# Patient Record
Sex: MALE | URBAN - NONMETROPOLITAN AREA
[De-identification: names, ages, dates, MRNs, and addresses within clinical notes are randomized per-mention and may not be internally consistent; named-entity substitution may affect disease eponyms.]

---

## 2020-04-13 ENCOUNTER — APPOINTMENT (RX ONLY)
Dept: URBAN - NONMETROPOLITAN AREA CLINIC 6 | Facility: CLINIC | Age: 40
Setting detail: DERMATOLOGY
End: 2020-04-13

## 2020-04-13 DIAGNOSIS — L30.8 OTHER SPECIFIED DERMATITIS: ICD-10-CM

## 2020-04-13 PROBLEM — L30.9 DERMATITIS, UNSPECIFIED: Status: ACTIVE | Noted: 2020-04-13

## 2020-04-13 PROCEDURE — ? PRESCRIPTION

## 2020-04-13 PROCEDURE — ? COUNSELING

## 2020-04-13 PROCEDURE — ? TREATMENT REGIMEN

## 2020-04-13 PROCEDURE — 99201: CPT

## 2020-04-13 RX ORDER — TACROLIMUS 1 MG/G
OINTMENT TOPICAL BID
Qty: 1 | Refills: 3 | Status: ERX | COMMUNITY
Start: 2020-04-13

## 2020-04-13 RX ADMIN — TACROLIMUS: 1 OINTMENT TOPICAL at 00:00

## 2020-04-13 ASSESSMENT — LOCATION DETAILED DESCRIPTION DERM
LOCATION DETAILED: RIGHT RADIAL DORSAL HAND
LOCATION DETAILED: RIGHT THENAR EMINENCE
LOCATION DETAILED: LEFT RADIAL PALM
LOCATION DETAILED: LEFT RADIAL DORSAL HAND

## 2020-04-13 ASSESSMENT — LOCATION SIMPLE DESCRIPTION DERM
LOCATION SIMPLE: LEFT HAND
LOCATION SIMPLE: RIGHT HAND

## 2020-04-13 ASSESSMENT — LOCATION ZONE DERM: LOCATION ZONE: HAND

## 2020-04-13 NOTE — PROCEDURE: TREATMENT REGIMEN
Samples Given: Cetaphil cleanser, Cetaphil moisturizer, All Free and Clear laundry detergent.
Plan: Wear white cotton glove liners under protective gloves when cooking, woodworking, and at bedtime. May wear white cotton glove liners while working on the computer also. Encouraged to switch to all fragrance free products. Provided him with our hand protection sheet and sensitive skin care sheet.
Detail Level: Zone
Initiate Treatment: Protopic ointment BID during the off week of the Betamethasone.
Continue Regimen: Betamethasone diproprionate cream QAM, and ointment QHS for 1 week on, then 1 week off. Repeat PRN.
Otc Regimen: Vaseline at bedtime over the steroid cream, under white cotton glove liners. Apply good moisturizer throughout the day.

## 2020-12-10 ENCOUNTER — APPOINTMENT (RX ONLY)
Dept: URBAN - NONMETROPOLITAN AREA CLINIC 6 | Facility: CLINIC | Age: 40
Setting detail: DERMATOLOGY
End: 2020-12-10

## 2020-12-10 DIAGNOSIS — L20.89 OTHER ATOPIC DERMATITIS: ICD-10-CM

## 2020-12-10 PROCEDURE — ? COUNSELING

## 2020-12-10 PROCEDURE — ? PRESCRIPTION

## 2020-12-10 PROCEDURE — ? TREATMENT REGIMEN

## 2020-12-10 PROCEDURE — 99212 OFFICE O/P EST SF 10 MIN: CPT

## 2020-12-10 RX ORDER — TACROLIMUS 1 MG/G
OINTMENT TOPICAL BID
Qty: 1 | Refills: 3 | Status: ERX

## 2020-12-10 RX ORDER — TRIAMCINOLONE ACETONIDE 1 MG/G
OINTMENT TOPICAL BID
Qty: 1 | Refills: 1 | Status: ERX | COMMUNITY
Start: 2020-12-10

## 2020-12-10 RX ADMIN — TRIAMCINOLONE ACETONIDE: 1 OINTMENT TOPICAL at 00:00

## 2020-12-10 NOTE — PROCEDURE: TREATMENT REGIMEN
Plan: Wear white cotton glove liners under protective gloves when cooking, woodworking, and at bedtime. May wear white cotton glove liners while working on the computer also. Encouraged to switch to all fragrance free products. Provided him with recommended OTC fragrance and dye free products.\\nDiscussed Dupixent risks and benefits at length, pt is interested in pursuing this as a treatment option.  Also discussed patch testing and UV light therapy.  Pt denies any history of cold sores or contraindications to Dupixent.\\nStarting paperwork for Dupixent and will be in contact with him to see if he does want to proceed with injections or if topical regimen is working well.
Initiate Treatment: Triamcinolone 0.1 % ointment BID followed by thick moisturizer for 2 weeks on and one week off
Otc Regimen: Vaseline at bedtime over his Rx ointments, under white cotton glove liners. Apply good moisturizer throughout the day.
Detail Level: Zone
Continue Regimen: Protopic 0.1% ointment BID on hands during week off from steroid ointment
Samples Given: CeraVe moisturizer ointment
Discontinue Regimen: Betamethasone cream

## 2021-01-07 ENCOUNTER — RX ONLY (OUTPATIENT)
Age: 41
Setting detail: RX ONLY
End: 2021-01-07

## 2021-01-07 RX ORDER — DUPILUMAB 300 MG/2ML
INJECTION, SOLUTION SUBCUTANEOUS
Qty: 1 | Refills: 5 | Status: ERX | COMMUNITY
Start: 2021-01-07

## 2021-03-04 ENCOUNTER — APPOINTMENT (RX ONLY)
Dept: URBAN - NONMETROPOLITAN AREA CLINIC 6 | Facility: CLINIC | Age: 41
Setting detail: DERMATOLOGY
End: 2021-03-04

## 2021-03-04 DIAGNOSIS — L20.89 OTHER ATOPIC DERMATITIS: ICD-10-CM | Status: IMPROVED

## 2021-03-04 PROCEDURE — 99213 OFFICE O/P EST LOW 20 MIN: CPT

## 2021-03-04 PROCEDURE — ? COUNSELING

## 2021-03-04 PROCEDURE — ? TREATMENT REGIMEN

## 2021-03-04 NOTE — HPI: MEDICATION (DUPIXENT)
Is This A New Presentation, Or A Follow-Up?: Follow Up Kristy
What Type Of Rash Do You Have?: atopic dermatitis
How Severe Is It?: moderate
Additional History: Pt has had his loading dose and another injection last week.  His itching has improved dramatically and his hands are much better.  He had a little eye irritation, but nothing that he felt he needed treatment for.  He has been injecting into his upper thighs and denies any injection site reaction.  He feels that his asthma has improved as well.

## 2021-03-04 NOTE — PROCEDURE: TREATMENT REGIMEN
Continue Regimen: Dupixent injection 300mg Q 2 weeks, Protopic 0.1% ointment BID on hands during week off from steroid ointment, Triamcinolone 0.1 % ointment BID followed by thick moisturizer for 2 weeks on and one week off
Otc Regimen: Vaseline at bedtime over his Rx ointments, under white cotton glove liners. Apply good moisturizer throughout the day.
Plan: Continue all fragrance free products.  Pt may add in lubricating eye drops daily or as needed to help with conjunctivitis.
Detail Level: Zone
Samples Given: CeraVe moisturizer ointment

## 2021-06-01 ENCOUNTER — APPOINTMENT (RX ONLY)
Dept: URBAN - NONMETROPOLITAN AREA CLINIC 6 | Facility: CLINIC | Age: 41
Setting detail: DERMATOLOGY
End: 2021-06-01

## 2021-06-01 DIAGNOSIS — L20.89 OTHER ATOPIC DERMATITIS: ICD-10-CM

## 2021-06-01 PROCEDURE — ? COUNSELING

## 2021-06-01 PROCEDURE — ? DUPIXENT MONITORING

## 2021-06-01 PROCEDURE — 99214 OFFICE O/P EST MOD 30 MIN: CPT

## 2021-06-01 PROCEDURE — ? TREATMENT REGIMEN

## 2021-06-01 NOTE — HPI: MEDICATION (DUPIXENT)
Is This A New Presentation, Or A Follow-Up?: Follow Up Kristy
What Type Of Rash Do You Have?: atopic dermatitis
How Severe Is It?: moderate
How Long Have You Been Taking Dupixent?: 3 months
Additional History: Pt’s left hand has cleared, but he still has some eczematous patches on his right hand.  He states the injection is very uncomfortable for him. He has the autoinjector and has tried different areas on his left, but it always really burns when the medication is going in.  He does not have any trouble with redness or swelling of the injection site.  He has not tried injecting his abdomen.  He went through the videos and injection training at our office.

## 2021-06-01 NOTE — PROCEDURE: TREATMENT REGIMEN
Otc Regimen: Vaseline at bedtime over his Rx ointments, under white cotton glove liners. Apply good moisturizer throughout the day.
Plan: Continue all fragrance free products.  Pt may add in lubricating eye drops daily or as needed if conjunctivitis occurs.  He is experiencing discomfort with the injections.  Discussed options of injecting into his abdomen, coming to our office for injections, or switching to a syringe vs the autoinjector.  Pt had called Dupixent for recommendations and they didn’t get back to him.  Placed a call to our Dupixent rep who will have a nurse from [facility] call him with recommendations.
Continue Regimen: Dupixent injection 300mg Q 2 weeks, Protopic 0.1% ointment BID on hands during week off from steroid ointment, Triamcinolone 0.1 % ointment BID followed by thick moisturizer for 2 weeks on and one week off
Detail Level: Zone

## 2021-12-07 ENCOUNTER — APPOINTMENT (RX ONLY)
Dept: URBAN - NONMETROPOLITAN AREA CLINIC 6 | Facility: CLINIC | Age: 41
Setting detail: DERMATOLOGY
End: 2021-12-07

## 2021-12-07 DIAGNOSIS — L20.89 OTHER ATOPIC DERMATITIS: ICD-10-CM | Status: IMPROVED

## 2021-12-07 PROCEDURE — ? COUNSELING

## 2021-12-07 PROCEDURE — ? DUPIXENT MONITORING

## 2021-12-07 PROCEDURE — ? TREATMENT REGIMEN

## 2021-12-07 PROCEDURE — 99214 OFFICE O/P EST MOD 30 MIN: CPT

## 2021-12-07 ASSESSMENT — BSA ECZEMA: % BODY COVERED IN ECZEMA: 1

## 2021-12-07 NOTE — HPI: MEDICATION (DUPIXENT)
Is This A New Presentation, Or A Follow-Up?: Follow Up Kristy
What Type Of Rash Do You Have?: atopic dermatitis
How Severe Is It?: moderate
How Long Have You Been Taking Dupixent?: 9 months
Additional History: Pt has been doing very well.  His left hand is clear, he just has a small thick scaly patch on his right hand.  He states he is doing better with the injections, some are just a little more uncomfortable than others.  Overall, he is very pleased with his progress.

## 2021-12-07 NOTE — PROCEDURE: TREATMENT REGIMEN
Otc Regimen: Vaseline at bedtime over his Rx ointments, under white cotton glove liners. Apply good moisturizer throughout the day.
Detail Level: Zone
Continue Regimen: Dupixent injection 300mg Q 2 weeks, Protopic 0.1% ointment BID on hands during week off from steroid ointment, Triamcinolone 0.1 % ointment BID followed by thick moisturizer for 2 weeks on and one week off

## 2022-06-14 ENCOUNTER — APPOINTMENT (RX ONLY)
Dept: URBAN - NONMETROPOLITAN AREA CLINIC 6 | Facility: CLINIC | Age: 42
Setting detail: DERMATOLOGY
End: 2022-06-14

## 2022-06-14 DIAGNOSIS — L82.1 OTHER SEBORRHEIC KERATOSIS: ICD-10-CM

## 2022-06-14 DIAGNOSIS — L20.89 OTHER ATOPIC DERMATITIS: ICD-10-CM

## 2022-06-14 PROCEDURE — ? DUPIXENT MONITORING

## 2022-06-14 PROCEDURE — 99214 OFFICE O/P EST MOD 30 MIN: CPT

## 2022-06-14 PROCEDURE — ? TREATMENT REGIMEN

## 2022-06-14 PROCEDURE — ? COUNSELING

## 2022-06-14 ASSESSMENT — LOCATION SIMPLE DESCRIPTION DERM: LOCATION SIMPLE: LEFT CHEEK

## 2022-06-14 ASSESSMENT — BSA ECZEMA: % BODY COVERED IN ECZEMA: 1

## 2022-06-14 ASSESSMENT — LOCATION DETAILED DESCRIPTION DERM: LOCATION DETAILED: LEFT INFERIOR CENTRAL MALAR CHEEK

## 2022-06-14 ASSESSMENT — ITCH NUMERIC RATING SCALE: HOW SEVERE IS YOUR ITCHING?: 1

## 2022-06-14 ASSESSMENT — LOCATION ZONE DERM: LOCATION ZONE: FACE

## 2022-06-14 NOTE — HPI: MEDICATION (DUPIXENT)
Is This A New Presentation, Or A Follow-Up?: Follow Up Kristy
What Type Of Rash Do You Have?: atopic dermatitis
How Severe Is It?: severe
How Long Have You Been Taking Dupixent?: 15 months
Additional History: He has had some trouble with injection site reactions.  Sometimes no discomfort, but sometimes it is very painful for 10-15 seconds.  He leaves his pen out for about 30-45 minutes before his injection.

## 2022-06-14 NOTE — PROCEDURE: TREATMENT REGIMEN
Plan: Recommend he leave his pen out 24 hours prior to injection to bring the medication to room temperature to help with discomfort.
Detail Level: Zone
Otc Regimen: Vaseline at bedtime over his Rx ointments, under white cotton glove liners. Apply good moisturizer throughout the day.
Continue Regimen: Dupixent injection 300mg Q 2 weeks, Protopic 0.1% ointment BID on hands during week off from steroid ointment, Triamcinolone 0.1 % ointment BID followed by thick moisturizer for 2 weeks on and one week off

## 2022-12-13 ENCOUNTER — APPOINTMENT (RX ONLY)
Dept: URBAN - NONMETROPOLITAN AREA CLINIC 6 | Facility: CLINIC | Age: 42
Setting detail: DERMATOLOGY
End: 2022-12-13

## 2022-12-13 DIAGNOSIS — Z71.89 OTHER SPECIFIED COUNSELING: ICD-10-CM

## 2022-12-13 DIAGNOSIS — D22 MELANOCYTIC NEVI: ICD-10-CM

## 2022-12-13 DIAGNOSIS — L91.8 OTHER HYPERTROPHIC DISORDERS OF THE SKIN: ICD-10-CM

## 2022-12-13 DIAGNOSIS — L20.89 OTHER ATOPIC DERMATITIS: ICD-10-CM

## 2022-12-13 DIAGNOSIS — L82.1 OTHER SEBORRHEIC KERATOSIS: ICD-10-CM

## 2022-12-13 PROBLEM — D23.71 OTHER BENIGN NEOPLASM OF SKIN OF RIGHT LOWER LIMB, INCLUDING HIP: Status: ACTIVE | Noted: 2022-12-13

## 2022-12-13 PROBLEM — D22.5 MELANOCYTIC NEVI OF TRUNK: Status: ACTIVE | Noted: 2022-12-13

## 2022-12-13 PROCEDURE — 99214 OFFICE O/P EST MOD 30 MIN: CPT

## 2022-12-13 PROCEDURE — ? PRESCRIPTION

## 2022-12-13 PROCEDURE — ? ADDITIONAL NOTES

## 2022-12-13 PROCEDURE — ? TREATMENT REGIMEN

## 2022-12-13 PROCEDURE — ? COUNSELING

## 2022-12-13 PROCEDURE — ? DUPIXENT MONITORING

## 2022-12-13 RX ORDER — TRIAMCINOLONE ACETONIDE 1 MG/G
OINTMENT TOPICAL
Qty: 80 | Refills: 2 | Status: ERX | COMMUNITY
Start: 2022-12-13

## 2022-12-13 RX ORDER — TACROLIMUS 1 MG/G
OINTMENT TOPICAL BID
Qty: 60 | Refills: 2 | Status: ERX | COMMUNITY
Start: 2022-12-13

## 2022-12-13 RX ADMIN — TRIAMCINOLONE ACETONIDE: 1 OINTMENT TOPICAL at 00:00

## 2022-12-13 RX ADMIN — TACROLIMUS: 1 OINTMENT TOPICAL at 00:00

## 2022-12-13 ASSESSMENT — LOCATION DETAILED DESCRIPTION DERM
LOCATION DETAILED: RIGHT CLAVICULAR NECK
LOCATION DETAILED: LEFT CLAVICULAR NECK
LOCATION DETAILED: LEFT INFERIOR CENTRAL MALAR CHEEK
LOCATION DETAILED: INFERIOR THORACIC SPINE

## 2022-12-13 ASSESSMENT — LOCATION ZONE DERM
LOCATION ZONE: TRUNK
LOCATION ZONE: NECK
LOCATION ZONE: FACE

## 2022-12-13 ASSESSMENT — LOCATION SIMPLE DESCRIPTION DERM
LOCATION SIMPLE: LEFT ANTERIOR NECK
LOCATION SIMPLE: LEFT CHEEK
LOCATION SIMPLE: RIGHT ANTERIOR NECK
LOCATION SIMPLE: UPPER BACK

## 2022-12-13 NOTE — HPI: MEDICATION (DUPIXENT)
Is This A New Presentation, Or A Follow-Up?: Follow Up Kristy
What Type Of Rash Do You Have?: atopic dermatitis
How Severe Is It?: moderate
Additional History: Medications working to keep irritation at minimal.

## 2022-12-13 NOTE — PROCEDURE: TREATMENT REGIMEN
Plan: Discussed Opzelura topical to try in place of TAC and Protopic, but he declines and will continue current regimen.
Detail Level: Zone
Otc Regimen: Vaseline at bedtime over his Rx ointments, under white cotton glove liners. Apply good moisturizer throughout the day.
Continue Regimen: Dupixent injection 300mg Q 2 weeks, Protopic 0.1% ointment BID on hands during week off from steroid ointment, Triamcinolone 0.1 % ointment BID followed by thick moisturizer for 2 weeks on and one week off

## 2022-12-13 NOTE — PROCEDURE: ADDITIONAL NOTES
Render Risk Assessment In Note?: no
Additional Notes: Offered freezing them with cryo therapy, or shave removal at any point when patient decides to remove them.
Detail Level: Zone

## 2023-06-13 ENCOUNTER — APPOINTMENT (RX ONLY)
Dept: URBAN - NONMETROPOLITAN AREA CLINIC 6 | Facility: CLINIC | Age: 43
Setting detail: DERMATOLOGY
End: 2023-06-13

## 2023-06-13 DIAGNOSIS — L20.89 OTHER ATOPIC DERMATITIS: ICD-10-CM

## 2023-06-13 PROCEDURE — ? TREATMENT REGIMEN

## 2023-06-13 PROCEDURE — 99214 OFFICE O/P EST MOD 30 MIN: CPT

## 2023-06-13 PROCEDURE — ? COUNSELING

## 2023-06-13 PROCEDURE — ? DUPIXENT MONITORING

## 2023-06-13 ASSESSMENT — ITCH NUMERIC RATING SCALE: HOW SEVERE IS YOUR ITCHING?: 1

## 2023-06-13 ASSESSMENT — BSA ECZEMA: % BODY COVERED IN ECZEMA: 1

## 2023-06-13 NOTE — PROCEDURE: TREATMENT REGIMEN
Plan: Discussed Rinvoq, cinbinqo, and Opzelura but he declines and will continue current regimen.
Detail Level: Zone
Otc Regimen: Vaseline at bedtime over his Rx ointments, under white cotton glove liners. Apply good moisturizer throughout the day.
Continue Regimen: Dupixent injection 300mg Q 2 weeks, Protopic 0.1% ointment BID on hands during week off from steroid ointment, Triamcinolone 0.1 % ointment BID followed by thick moisturizer for 2 weeks on and one week off

## 2023-06-13 NOTE — HPI: MEDICATION (DUPIXENT)
Is This A New Presentation, Or A Follow-Up?: Follow Up Kristy
What Type Of Rash Do You Have?: atopic dermatitis
How Severe Is It?: moderate

## 2023-06-13 NOTE — PROCEDURE: MIPS QUALITY
Quality 431: Preventive Care And Screening: Unhealthy Alcohol Use - Screening: Patient not identified as an unhealthy alcohol user when screened for unhealthy alcohol use using a systematic screening method
Detail Level: Detailed
Quality 226: Preventive Care And Screening: Tobacco Use: Screening And Cessation Intervention: Patient screened for tobacco use and is an ex/non-smoker
Quality 486: Dermatitis - Improvement In Patient-Reported Itch Severity: Itch severity assessment score is reduced by 2 or more points from the initial (index) assessment score to the follow-up visit score

## 2024-01-30 ENCOUNTER — APPOINTMENT (RX ONLY)
Dept: URBAN - NONMETROPOLITAN AREA CLINIC 6 | Facility: CLINIC | Age: 44
Setting detail: DERMATOLOGY
End: 2024-01-30

## 2024-01-30 DIAGNOSIS — L20.89 OTHER ATOPIC DERMATITIS: ICD-10-CM

## 2024-01-30 PROCEDURE — 99214 OFFICE O/P EST MOD 30 MIN: CPT

## 2024-01-30 PROCEDURE — ? DUPIXENT MONITORING

## 2024-01-30 PROCEDURE — ? COUNSELING

## 2024-01-30 PROCEDURE — ? TREATMENT REGIMEN

## 2024-01-30 ASSESSMENT — BSA ECZEMA: % BODY COVERED IN ECZEMA: 1

## 2024-01-30 ASSESSMENT — ITCH NUMERIC RATING SCALE: HOW SEVERE IS YOUR ITCHING?: 2

## 2024-01-30 NOTE — PROCEDURE: MIPS QUALITY
Detail Level: Detailed
Quality 226: Preventive Care And Screening: Tobacco Use: Screening And Cessation Intervention: Patient screened for tobacco use and is an ex/non-smoker
Quality 486: Dermatitis - Improvement In Patient-Reported Itch Severity: Itch severity assessment score is reduced by 2 or more points from the initial (index) assessment score to the follow-up visit score

## 2024-01-30 NOTE — HPI: MEDICATION (DUPIXENT)
Is This A New Presentation, Or A Follow-Up?: Follow Up Kristy
What Type Of Rash Do You Have?: atopic dermatitis
How Severe Is It?: moderate
How Long Have You Been Taking Dupixent?: 2.5 years

## 2024-07-30 ENCOUNTER — APPOINTMENT (RX ONLY)
Dept: URBAN - NONMETROPOLITAN AREA CLINIC 5 | Facility: CLINIC | Age: 44
Setting detail: DERMATOLOGY
End: 2024-07-30

## 2024-07-30 DIAGNOSIS — L20.89 OTHER ATOPIC DERMATITIS: ICD-10-CM

## 2024-07-30 PROCEDURE — 99214 OFFICE O/P EST MOD 30 MIN: CPT

## 2024-07-30 PROCEDURE — ? DUPIXENT MONITORING

## 2024-07-30 PROCEDURE — ? COUNSELING

## 2024-07-30 PROCEDURE — ? TREATMENT REGIMEN

## 2024-07-30 ASSESSMENT — BSA ECZEMA: % BODY COVERED IN ECZEMA: 1

## 2024-07-30 ASSESSMENT — ITCH NUMERIC RATING SCALE: HOW SEVERE IS YOUR ITCHING?: 0

## 2024-07-30 NOTE — PROCEDURE: TREATMENT REGIMEN
Plan: Discussed Rinvoq, cinbinqo, and Opzelura but he declines and will continue current regimen.  Samples of Vtama provided he can try once daily, should be approved for AD at next visit, so we can prescribe if he feels that it is helpful.
Detail Level: Zone
Otc Regimen: Vaseline at bedtime over his Rx ointments, under white cotton glove liners. Apply good moisturizer throughout the day.
Samples Given: Vtama- can use once daily
Continue Regimen: Dupixent injection 300mg Q 2 weeks, Protopic 0.1% ointment BID on hands during week off from steroid ointment, Triamcinolone 0.1 % ointment BID followed by thick moisturizer for 2 weeks on and one week off

## 2024-07-30 NOTE — HPI: MEDICATION (DUPIXENT)
Is This A New Presentation, Or A Follow-Up?: Follow Up Kristy
What Type Of Rash Do You Have?: atopic dermatitis
How Severe Is It?: moderate
How Long Have You Been Taking Dupixent?: 3 years

## 2025-01-06 ENCOUNTER — RX ONLY (RX ONLY)
Age: 45
End: 2025-01-06

## 2025-01-06 RX ORDER — DUPILUMAB 300 MG/2ML
INJECTION, SOLUTION SUBCUTANEOUS
Qty: 2 | Refills: 10